# Patient Record
Sex: MALE
[De-identification: names, ages, dates, MRNs, and addresses within clinical notes are randomized per-mention and may not be internally consistent; named-entity substitution may affect disease eponyms.]

---

## 2023-08-20 PROBLEM — Z00.00 ENCOUNTER FOR PREVENTIVE HEALTH EXAMINATION: Status: ACTIVE | Noted: 2023-08-20

## 2023-08-21 ENCOUNTER — APPOINTMENT (OUTPATIENT)
Dept: UROLOGY | Facility: CLINIC | Age: 43
End: 2023-08-21
Payer: COMMERCIAL

## 2023-08-21 VITALS
HEIGHT: 73 IN | TEMPERATURE: 98.7 F | SYSTOLIC BLOOD PRESSURE: 110 MMHG | DIASTOLIC BLOOD PRESSURE: 76 MMHG | OXYGEN SATURATION: 96 % | HEART RATE: 74 BPM | BODY MASS INDEX: 31.14 KG/M2 | WEIGHT: 235 LBS | RESPIRATION RATE: 16 BRPM

## 2023-08-21 DIAGNOSIS — R31.29 OTHER MICROSCOPIC HEMATURIA: ICD-10-CM

## 2023-08-21 PROCEDURE — 99204 OFFICE O/P NEW MOD 45 MIN: CPT

## 2023-08-21 NOTE — HISTORY OF PRESENT ILLNESS
[FreeTextEntry1] : Language: English Date of First visit: 2023 Accompanied by: self Contact info: Referring Provider/PCP: Dr. Nadine Yeung Fax: [phone]     CC/ Problem List:  LUTS - resolved =============================================================================== FIRST VISIT / Summary: Very pleasant 42 year old M here for tightness at the end of urinary stream. Symptoms came on spontaneously in 2023 and worsened for 2-3 weeks. No remediating or exacerbating factors. Symptom was mostly confined to the bladder and did not radiate. No pain just discomfort. He presented at his PCP office where UA (but no UCx) and STI labs were performed and no abnormal findings. He reports microhematuria in the past during pre-op workup for varicose and foot surgeries. He verbalizes microhematuria work up was completed and no abnormal findings. He reports symptoms have now resolved. No urinary complaints. Denies pain, hematuria, dysuria, discharge, or history of kidney stone.    ------------------------------------------------------------------------------------------- INTERVAL VISITS:   ===============================================================================   PMH: denies Meds: denies All: denies FHx: maternal grandmother  with brain cancer; no  malignancies SocHx: non-smoker, occasional ETOH   PSH: right 5th metatarsal, right leg varicose surgery      ROS: Review of Systems is as per HPI unless otherwise denoted below     =============================================================================== DATA:   LABS (SELECTED):---------------------------------------------------------------------------------------------------     RADS:-------------------------------------------------------------------------------------------------------------------     PATHOLOGY/CYTOLOGY:-------------------------------------------------------------------------------------------     VOIDING STUDIES: ----------------------------------------------------------------------------------------------------     STONE STUDIES: ([facility]/RUSSSA)----------------------------------------------------------------------------------     PROCEDURES: -----------------------------------------------------------------------------------------------         ===============================================================================   PHYSICAL EXAM: declined     FOCUSED: ----------------------------------------------------------------------------------------------------------------     ======================================================================================= DISCUSSION: ======================================================================================= ASSESSMENT and PLAN   Microhematuria in past  -UA/UCx -Renal US -advised to have 2023 lab results (UA and STI) sent to office   RTC if symptoms recur         =======================================================================================  MDM 4 Thank you for allowing me to assist in the care of your patient. Should you have any questions please do not hesitate to reach out to me.     Inocencio Raya MD                                                           [facility]   [place] Office:  [address], [address]  [place] [address]   T: [phone]   F: [phone]     [place] Office:  [address], [address] [place] [address] T: [phone] [phone]

## 2023-08-22 LAB
APPEARANCE: CLEAR
BACTERIA: NEGATIVE /HPF
BILIRUBIN URINE: NEGATIVE
BLOOD URINE: NEGATIVE
CAST: 0 /LPF
COLOR: YELLOW
EPITHELIAL CELLS: 0 /HPF
GLUCOSE QUALITATIVE U: NEGATIVE MG/DL
KETONES URINE: NEGATIVE MG/DL
LEUKOCYTE ESTERASE URINE: NEGATIVE
MICROSCOPIC-UA: NORMAL
NITRITE URINE: NEGATIVE
PH URINE: 6.5
PROTEIN URINE: NEGATIVE MG/DL
RED BLOOD CELLS URINE: 0 /HPF
SPECIFIC GRAVITY URINE: 1
UROBILINOGEN URINE: 0.2 MG/DL
WHITE BLOOD CELLS URINE: 0 /HPF

## 2023-08-23 LAB — BACTERIA UR CULT: NORMAL
